# Patient Record
Sex: FEMALE | Race: WHITE | NOT HISPANIC OR LATINO | Employment: OTHER | URBAN - METROPOLITAN AREA
[De-identification: names, ages, dates, MRNs, and addresses within clinical notes are randomized per-mention and may not be internally consistent; named-entity substitution may affect disease eponyms.]

---

## 2024-09-17 ENCOUNTER — APPOINTMENT (OUTPATIENT)
Dept: OBSTETRICS AND GYNECOLOGY | Facility: CLINIC | Age: 24
End: 2024-09-17

## 2024-09-17 VITALS
DIASTOLIC BLOOD PRESSURE: 76 MMHG | BODY MASS INDEX: 23.79 KG/M2 | WEIGHT: 126 LBS | SYSTOLIC BLOOD PRESSURE: 116 MMHG | HEIGHT: 61 IN

## 2024-09-17 DIAGNOSIS — Z01.419 WELL WOMAN EXAM: ICD-10-CM

## 2024-09-17 PROCEDURE — 1036F TOBACCO NON-USER: CPT | Performed by: OBSTETRICS & GYNECOLOGY

## 2024-09-17 PROCEDURE — 87624 HPV HI-RISK TYP POOLED RSLT: CPT

## 2024-09-17 PROCEDURE — 3008F BODY MASS INDEX DOCD: CPT | Performed by: OBSTETRICS & GYNECOLOGY

## 2024-09-17 PROCEDURE — 88175 CYTOPATH C/V AUTO FLUID REDO: CPT

## 2024-09-17 PROCEDURE — 99385 PREV VISIT NEW AGE 18-39: CPT | Performed by: OBSTETRICS & GYNECOLOGY

## 2024-09-17 ASSESSMENT — PAIN SCALES - GENERAL: PAINLEVEL: 0-NO PAIN

## 2024-09-17 NOTE — PROGRESS NOTES
"Gina Casanova is a 24 y.o. female who is here for a routine exam. PCP = Jesus Granger MD  Complains of significant cramping prior to cycle  Mother with a history of endometriosis  Symptoms better when using NuvaRing however patient does not want to be on hormonal contraception  No history of STI    Works in [employer] and also at [employer]    Menses : Monthly  Contraception : Not sexually active currently  HPV vaccine : Yes  Last pap : A few years ago normal  Last HPV : Never  History of abnormal pap : No  Last mammogram : Never  History of abnormal mammogram : No  Colon cancer screen : Never    ROS  systems reviewed, anything negative noted in HPI    bladder: no dysuria, gross hematuria, urinary frequency, urgency or incontinence  breast: no lumps, nipple d/c, overlying skin changes, redness, or skin retraction    [unfilled]    Past med hx and past surg hx reviewed and notable for: ADHD    Objective   /76   Ht 1.549 m (5' 1\")   Wt 57.2 kg (126 lb)   LMP 09/10/2024   BMI 23.81 kg/m²      General:   Alert and oriented, in no acute distress   Neck: Supple. No visible thyromegaly.    Breast/Axilla: Normal to palpation bilaterally without masses, skin changes, lymphadenopathy, or nipple discharge.    Abdomen: Soft, non-tender, without masses or organomegaly   Vulva: Normal architecture without erythema, masses, or lesions.    Vagina: Normal mucosa without lesions, masses, or atrophy. No abnormal vaginal discharge.    Cervix: Normal without masses, lesions, or signs of cervicitis.    Uterus: Normal mobile, non-enlarged uterus    Adnexa: Normal without masses or lesions   Pelvic Floor No POP noted.    Psych Normal affect. Normal mood.      Thank you for coming to your annual exam. Your findings during the exam were normal. Specific topics addressed during this exam included: healthy lifestyle, well woman screening guidelines,     Actions performed during this visit include:  - Clinical breast exam  - " Clinical pelvic exam  - pap  - declines STI screen  - declines hormonal contraception  - enc condoms  - rec NSAIDs to start a few days prior to menses for dysmenorrhea  No orders of the defined types were placed in this encounter.          Please return for your next visit in 1 year.

## 2025-03-19 ENCOUNTER — OFFICE VISIT (OUTPATIENT)
Dept: URGENT CARE | Age: 25
End: 2025-03-19
Payer: COMMERCIAL

## 2025-03-19 VITALS
SYSTOLIC BLOOD PRESSURE: 116 MMHG | DIASTOLIC BLOOD PRESSURE: 76 MMHG | TEMPERATURE: 98.3 F | HEART RATE: 77 BPM | RESPIRATION RATE: 16 BRPM | OXYGEN SATURATION: 98 %

## 2025-03-19 DIAGNOSIS — R09.81 CONGESTION OF NASAL SINUS: ICD-10-CM

## 2025-03-19 DIAGNOSIS — J32.9 RHINOSINUSITIS: Primary | ICD-10-CM

## 2025-03-19 LAB — POC SARS-COV-2 AG BINAX: NORMAL

## 2025-03-19 PROCEDURE — 99203 OFFICE O/P NEW LOW 30 MIN: CPT

## 2025-03-19 PROCEDURE — 87811 SARS-COV-2 COVID19 W/OPTIC: CPT

## 2025-03-19 PROCEDURE — 1036F TOBACCO NON-USER: CPT

## 2025-03-19 RX ORDER — BROMPHENIRAMINE MALEATE, PSEUDOEPHEDRINE HYDROCHLORIDE, AND DEXTROMETHORPHAN HYDROBROMIDE 2; 30; 10 MG/5ML; MG/5ML; MG/5ML
10 SYRUP ORAL EVERY 6 HOURS PRN
Qty: 120 ML | Refills: 0 | Status: SHIPPED | OUTPATIENT
Start: 2025-03-19

## 2025-03-19 ASSESSMENT — ENCOUNTER SYMPTOMS
FEVER: 0
TROUBLE SWALLOWING: 0
SINUS PRESSURE: 1
SORE THROAT: 0
SHORTNESS OF BREATH: 0
WHEEZING: 0
VOMITING: 0

## 2025-03-19 NOTE — LETTER
March 19, 2025     Patient: Gina Casanova   YOB: 2000   Date of Visit: 3/19/2025       To Whom It May Concern:    It is my medical opinion that Gina Casanova may return to work on 3/19/25 .  She was seen Today and clear to go to work.     If you have any questions or concerns, please don't hesitate to call.         Sincerely,        Maribel Jimenez PA-C    CC: No Recipients

## 2025-03-19 NOTE — PROGRESS NOTES
Subjective   Patient ID: Gina Casanova is a 24 y.o. female. They present today with a chief complaint of 6 days of congestion .    HISTORY OF PRESENT ILLNESS:    Presents to the clinic for upper respiratory congestion, sinus pressure, discolored mucus production, and loss of taste/smell. Symptoms ongoing x 5 days. Denies confirmed sick contacts but requests COVID-19 test today. No fever, wheezing, dyspnea. No hx of asthma or inhaler use. Taking Sudafed PRN.    Past Medical History  Allergies as of 03/19/2025 - Reviewed 03/19/2025   Allergen Reaction Noted    Gluten Diarrhea 09/17/2024    Milk containing products (dairy) GI Upset 09/17/2024       Current Outpatient Medications   Medication Instructions    brompheniramine-pseudoeph-DM 2-30-10 mg/5 mL syrup 10 mL, oral, Every 6 hours PRN, May cause drowsiness. Use precautions.         Past Medical History:   Diagnosis Date    Personal history of other mental and behavioral disorders     History of depression       No past surgical history on file.     reports that she has never smoked. She has never used smokeless tobacco. She reports that she does not use drugs.    Review of Systems    Review of Systems   Constitutional:  Negative for fever.   HENT:  Positive for congestion and sinus pressure. Negative for drooling, sore throat and trouble swallowing.    Respiratory:  Negative for shortness of breath and wheezing.    Cardiovascular:  Negative for chest pain.   Gastrointestinal:  Negative for vomiting.   Skin:  Negative for rash.                                 Objective    Vitals:    03/19/25 0925   BP: 116/76   Pulse: 77   Resp: 16   Temp: 36.8 °C (98.3 °F)   SpO2: 98%     Patient's last menstrual period was 03/05/2025 (approximate).  PHYSICAL EXAMINATION:    Physical Exam  Vitals and nursing note reviewed.   Constitutional:       General: She is not in acute distress.     Appearance: Normal appearance. She is not ill-appearing.   HENT:      Head: Normocephalic and  atraumatic.      Right Ear: Tympanic membrane, ear canal and external ear normal.      Left Ear: Tympanic membrane, ear canal and external ear normal.      Nose: Nose normal.      Mouth/Throat:      Mouth: Mucous membranes are moist.      Pharynx: Oropharynx is clear. No oropharyngeal exudate or posterior oropharyngeal erythema.   Eyes:      General:         Right eye: No discharge.         Left eye: No discharge.      Extraocular Movements: Extraocular movements intact.      Conjunctiva/sclera: Conjunctivae normal.   Cardiovascular:      Rate and Rhythm: Normal rate and regular rhythm.      Heart sounds: Normal heart sounds.   Pulmonary:      Effort: Pulmonary effort is normal. No respiratory distress.      Breath sounds: Normal breath sounds. No stridor. No wheezing, rhonchi or rales.   Musculoskeletal:      Cervical back: Normal range of motion and neck supple.   Lymphadenopathy:      Cervical: No cervical adenopathy.   Skin:     General: Skin is warm and dry.      Findings: No rash.   Neurological:      General: No focal deficit present.      Mental Status: She is alert and oriented to person, place, and time.   Psychiatric:         Mood and Affect: Mood normal.         Behavior: Behavior normal.          Procedures    Results for orders placed or performed in visit on 03/19/25   POCT Covid-19 Rapid Antigen   Result Value Ref Range    POC DARREN-COV-2 AG  Presumptive negative test for SARS-CoV-2 (no antigen detected)     Presumptive negative test for SARS-CoV-2 (no antigen detected)       Diagnostic study results (if any) were reviewed by Maribel Jimenez PA-C.    Assessment/Plan   Allergies, medications, history, and pertinent labs/EKGs/Imaging reviewed by Maribel Jimenez PA-C.     Orders and Diagnoses  Diagnoses and all orders for this visit:  Rhinosinusitis  -     brompheniramine-pseudoeph-DM 2-30-10 mg/5 mL syrup; Take 10 mL by mouth every 6 hours if needed for congestion or cough. May cause drowsiness. Use  precautions.  Congestion of nasal sinus  -     POCT Covid-19 Rapid Antigen      Medical Admin Record    Given overall well appearance, vital signs, history, and exam as above, there is no indication for further emergent testing/intervention at this time.      I discussed with the patient my clinical thoughts at this time given the above and we had a shared decision-making conversation in a patient-centered decision-making model on how to proceed forward. Pt was instructed on the importance of close follow-up. They were told that an urgent care diagnosis is often a preliminary impression and that definitive care is often not able to be given in the urgent care setting. Pt was educated that close follow-up is essential for good health and good outcomes. Patient was provided with the following instructions:         May take Bromfed for symptomatic relief of congestion/dry cough as needed. Add guaifenesin for mucus clearance if needed.     Cool mist humidifier in room at night while sleeping. Sleep in semi elevated position.    Tea with honey, throat lozenges, vapor rub, voice rest.     Tylenol or ibuprofen for fevers/pain if needed.      Plenty of fluids and rest.      Good hand hygiene.      Follow up with PCP or RTC in the next 7 days if sx fail to show adequate improvement.        Clinical impression as well as limitations of available testing/examination, all discussed with patient. Pt is well at this time in the urgent care. They are comfortable with the present assessment and plan to be discharged home. Discussed with them close outpatient follow up, reassessment, and possible further testing/treatment via their PCP/specialist if symptoms fail to improve; they agree, understand, and are comfortable with this plan. Pt given the opportunity to ask questions prior to discharge and all questions were answered at this time. Via our discussion, the patient was advised of warning signs and instructions were reviewed.  Strict ED precautions were given, acknowledged, and understood. Discussed with the patient/family that it is okay to return to the urgent care at any time for anything. Advised to present to the ED if present condition changes/worsens or if they develop new symptoms at any time after discharge. Also, advised to go to the ED if they cannot establish outpatient follow-up in time frame specified above. Pt verbalized understanding and agreement with plan and instructions. Discussed the need for close follow up with their primary care provider and/or specialist for further testing/treatment/care/consultation in the short time frame as noted above, if needed - they understand these instructions and agree to close follow up for these reasons. Patient discharged home in stable condition.      Follow Up Instructions  No follow-ups on file.    Patient disposition: Home    Electronically signed by Maribel Jimenez PA-C  9:54 AM

## 2025-07-07 ENCOUNTER — OFFICE VISIT (OUTPATIENT)
Dept: URGENT CARE | Age: 25
End: 2025-07-07
Payer: COMMERCIAL

## 2025-07-07 VITALS
SYSTOLIC BLOOD PRESSURE: 108 MMHG | HEART RATE: 59 BPM | OXYGEN SATURATION: 96 % | DIASTOLIC BLOOD PRESSURE: 73 MMHG | RESPIRATION RATE: 15 BRPM | TEMPERATURE: 97.8 F

## 2025-07-07 DIAGNOSIS — K21.9 GASTROESOPHAGEAL REFLUX DISEASE, UNSPECIFIED WHETHER ESOPHAGITIS PRESENT: ICD-10-CM

## 2025-07-07 DIAGNOSIS — K59.00 CONSTIPATION, UNSPECIFIED CONSTIPATION TYPE: Primary | ICD-10-CM

## 2025-07-07 LAB — PREGNANCY TEST URINE, POC: NEGATIVE

## 2025-07-07 RX ORDER — OMEPRAZOLE 20 MG/1
20 CAPSULE, DELAYED RELEASE ORAL
Qty: 30 CAPSULE | Refills: 1 | Status: SHIPPED | OUTPATIENT
Start: 2025-07-07

## 2025-07-07 NOTE — PROGRESS NOTES
Subjective:  Patient is a 24 y/o female who presents today with GI concerns. She reports epigastric discomfort and heart burn x 2 weeks. She had one episode of non-bloody vomiting 2 weeks ago, however none since then. Has some occasional nausea. She has also been constipated x 2 weeks and then this morning had some bright red blood in the stool. No rectal pain or discomfort. No fever, chills, weakness, abdominal pain, flank pain, or UTI symptoms. No chest pain or SOB. She has history of hemorrhoids and IBS. Pt denies recent travel, eating anything out of the ordinary, sick contacts, or antibiotics. Pt denies possibility of pregnancy. She has not tried any medication for relief.     ROS:  Negative except as above      PAST MEDICAL HISTORY:  Medical History[1]      ALLERGIES:  Allergies[2]      MEDICATIONS:  Current Medications[3]        Objective:  /73 (BP Location: Right arm, Patient Position: Sitting)   Pulse 59   Temp 36.6 °C (97.8 °F)   Resp 15   LMP 06/14/2025   SpO2 96%     General Appearance: Alert and oriented. NAD. Appears comfortable. No SOB or labored breathing  HEENT: NC/AT, PERRL, EOMI, TM wnl, nose - wnl  Mouth: Mucous membranes pink and moist.  Neck: supple, no LAD  Lungs: Clear to auscultation bilaterally. No wheezing, crackles, rales or rhonchi.  Heart: NSR, S1, S2 present. No murmurs, rubs or gallops.  Abdomen: Bowel sounds normal in all 4 quadrants. Soft, mild epigastric TTP. No rebound, guarding. No palpable hepatosplenomegaly. Negative Reddy's. No McBurney's point TTP.   Rectal: She declines exam today  Back: no CVA tenderness bilaterally  Skin: warm, no rashes  Ext: no edema        Labs:  Results for orders placed or performed in visit on 07/07/25   POCT pregnancy, urine manually resulted    Collection Time: 07/07/25 12:25 PM   Result Value Ref Range    Preg Test, Ur Negative Negative         ASSESSMENT/PLAN:  1. Constipation, unspecified constipation type  POCT pregnancy, urine  manually resulted    Referral to Gastroenterology    Referral to Primary Care      2. Gastroesophageal reflux disease, unspecified whether esophagitis present  omeprazole (PriLOSEC) 20 mg DR capsule    Referral to Gastroenterology    Referral to Primary Care          Pt with heartburn, epigastric discomfort, and constipation x 2 weeks  She is very well appearing today, vitals stable  Suspect GERD and constipation leading to hemorrhoid/bleeding  She declines a rectal exam today, but reports history of hemorrhoids in the past  POCT preg negative  Recommend starting PPI daily, discussed dietary recommendations as well  Also recommend starting Miralax daily - she has at home  Discussed adequate water intake, getting fiber in diet, avoid straining with BM  She will follow up with PCP and GI if symptoms are continuing  To ED for worsening symptoms (abdominal pain, fever, vomiting, bloody diarrhea, rectal pain)        Karyna Castaneda PA-C      Medical Decision Making  At time of discharge patient was clinically well-appearing and HDS for outpatient management. The patient and/or family was educated regarding diagnosis, supportive care, OTC and Rx medications. The patient and/or family was given the opportunity to ask questions prior to discharge.  They verbalized understanding of my discussion of the plans for treatment, expected course, indications to return to  or seek further evaluation in ED, and the need for timely follow up as directed. They were provided with a work/school excuse if requested.             [1]   Past Medical History:  Diagnosis Date    Personal history of other mental and behavioral disorders     History of depression   [2]   Allergies  Allergen Reactions    Gluten Diarrhea    Milk Containing Products (Dairy) GI Upset   [3]   Current Outpatient Medications   Medication Sig Dispense Refill    brompheniramine-pseudoeph-DM 2-30-10 mg/5 mL syrup Take 10 mL by mouth every 6 hours if needed for congestion  or cough. May cause drowsiness. Use precautions. (Patient not taking: Reported on 7/7/2025) 120 mL 0    omeprazole (PriLOSEC) 20 mg DR capsule Take 1 capsule (20 mg) by mouth once daily in the morning. Take before meals. Do not crush or chew. 30 capsule 1     No current facility-administered medications for this visit.

## 2025-07-25 ENCOUNTER — APPOINTMENT (OUTPATIENT)
Dept: PRIMARY CARE | Facility: CLINIC | Age: 25
End: 2025-07-25
Payer: COMMERCIAL